# Patient Record
Sex: MALE | Race: WHITE | NOT HISPANIC OR LATINO | URBAN - NONMETROPOLITAN AREA
[De-identification: names, ages, dates, MRNs, and addresses within clinical notes are randomized per-mention and may not be internally consistent; named-entity substitution may affect disease eponyms.]

---

## 2018-07-06 ENCOUNTER — OFFICE VISIT (OUTPATIENT)
Dept: RETAIL CLINIC | Facility: CLINIC | Age: 48
End: 2018-07-06

## 2018-07-06 VITALS
WEIGHT: 233.8 LBS | SYSTOLIC BLOOD PRESSURE: 138 MMHG | TEMPERATURE: 98.1 F | HEART RATE: 83 BPM | RESPIRATION RATE: 18 BRPM | DIASTOLIC BLOOD PRESSURE: 88 MMHG | BODY MASS INDEX: 33.47 KG/M2 | HEIGHT: 70 IN | OXYGEN SATURATION: 98 %

## 2018-07-06 DIAGNOSIS — T63.461A WASP STING, ACCIDENTAL OR UNINTENTIONAL, INITIAL ENCOUNTER: Primary | ICD-10-CM

## 2018-07-06 PROCEDURE — 99202 OFFICE O/P NEW SF 15 MIN: CPT | Performed by: NURSE PRACTITIONER

## 2018-07-06 PROCEDURE — 96372 THER/PROPH/DIAG INJ SC/IM: CPT | Performed by: NURSE PRACTITIONER

## 2018-07-06 RX ORDER — METHYLPREDNISOLONE 4 MG/1
TABLET ORAL
Qty: 1 EACH | Refills: 0 | Status: SHIPPED | OUTPATIENT
Start: 2018-07-06

## 2018-07-06 RX ORDER — DIPHENHYDRAMINE HCL 25 MG
25 TABLET ORAL EVERY 6 HOURS
Qty: 8 TABLET | Refills: 0
Start: 2018-07-06 | End: 2018-07-08

## 2018-07-06 RX ORDER — DEXAMETHASONE SODIUM PHOSPHATE 4 MG/ML
8 INJECTION, SOLUTION INTRA-ARTICULAR; INTRALESIONAL; INTRAMUSCULAR; INTRAVENOUS; SOFT TISSUE ONCE
Status: COMPLETED | OUTPATIENT
Start: 2018-07-06 | End: 2018-07-06

## 2018-07-06 RX ADMIN — DEXAMETHASONE SODIUM PHOSPHATE 8 MG: 4 INJECTION, SOLUTION INTRA-ARTICULAR; INTRALESIONAL; INTRAMUSCULAR; INTRAVENOUS; SOFT TISSUE at 10:32

## 2018-07-06 NOTE — PROGRESS NOTES
"  Chief Complaint   Patient presents with   • wasp sting     Subjective   Jonas Orourke is a 47 y.o. male who presents to the clinic today with complaints of wasp sting.  He was stung twice yesterday (about 20 hours ago) once on his right index finger and once near right elbow.  He put his hand in ice water for 20 minutes and also put an ice pack on his arm.  He took Benadryl twice yesterday.  When he went to bed last night the areas looked a little better. This morning he has had more swelling, warmth, redness, itchiness. He does note sleeping on his right arm last night.   He has been stung in the past and had a severe reaction with swelling (no anaphylaxis) at that time.  He has \"weird sleep and dreams\" when taking Benadryl, but otherwise can tolerate it okay.     His blood pressure is high today.  He has been out of Benicar since Monday.  He will obtain a refill here at [facility] today and start taking it.     HPI    Current Outpatient Prescriptions:   •  LEVOTHYROXINE SODIUM PO, Take  by mouth., Disp: , Rfl:   •  Olmesartan Medoxomil (BENICAR PO), Take  by mouth., Disp: , Rfl:     Allergies:  Penicillins    Past Medical History:   Diagnosis Date   • Acid reflux    • Allergic    • Hypertension    • Kidney stone      Past Surgical History:   Procedure Laterality Date   • ORBITAL DECOMPRESSION Bilateral      Family History   Problem Relation Age of Onset   • Cancer Father      Social History   Substance Use Topics   • Smoking status: Never Smoker   • Smokeless tobacco: Never Used   • Alcohol use No       Review of Systems  Review of Systems   Constitutional: Negative for chills, fatigue and fever.   HENT: Negative for facial swelling and trouble swallowing.    Eyes: Negative.    Respiratory: Negative for chest tightness, shortness of breath and wheezing.    Skin: Positive for wound.        See HPI       Objective   /88 (BP Location: Left arm, Patient Position: Sitting, Cuff Size: Adult)   Pulse 83   Temp " "98.1 °F (36.7 °C) (Oral)   Resp 18   Ht 177.8 cm (70\")   Wt 106 kg (233 lb 12.8 oz)   SpO2 98%   BMI 33.55 kg/m²       Physical Exam   Constitutional: He is oriented to person, place, and time. He appears well-developed and well-nourished. No distress.   HENT:   Head: Normocephalic and atraumatic.   Mouth/Throat: Oropharynx is clear and moist.   Cardiovascular: Normal rate, regular rhythm and normal heart sounds.    Pulmonary/Chest: Effort normal and breath sounds normal. No respiratory distress. He has no wheezes.   Neurological: He is alert and oriented to person, place, and time.   Skin: Skin is warm and dry.        Psychiatric: He has a normal mood and affect. His behavior is normal.       Assessment/Plan     Jonas was seen today for wasp sting.    Diagnoses and all orders for this visit:    Wasp sting, accidental or unintentional, initial encounter  -     dexamethasone (DECADRON) injection 8 mg; Inject 2 mL into the shoulder, thigh, or buttocks 1 (One) Time.    Other orders  -     MethylPREDNISolone (MEDROL, MANUELA,) 4 MG tablet; Take as directed on package instructions.  -     diphenhydrAMINE (BENADRYL) 25 MG tablet; Take 1 tablet by mouth Every 6 (Six) Hours for 2 days.      Elevate arm when possible. Take Benadryl every six hours for the next couple of days if tolerated.  Apply ice pack to your hand and elbow for 10 minutes at a time a few times today.  Try not to put pressure on your arm (don't sleep on this side).   Monitor for s/s of secondary infection as discussed.     Risk and benefits of steroids discussed. Start steroid pack tomorrow.  Take only as prescribed.  Take with food.   ~Steroids can increase blood pressure and blood sugar.  ~Steroids can cause increased irritability, increased energy & appetite, and may disrupt sleep.   ~They may cause sun sensitivity. You should avoid prolonged sun exposure and use sunscreen if outdoors.   ~Do not take additional NSAIDS such as ibuprofen, Motrin, Advil, " Aleve, Naproxen while taking this medicine.  It is okay to take Tylenol while taking steroids.    Restart Benicar today and monitor blood pressure as discussed.     If symptoms persist or worsen please see your primary care provider or go to urgent care.

## 2018-07-06 NOTE — PATIENT INSTRUCTIONS
Bee, Wasp, or Hornet Sting, Adult  Bees, wasps, and hornets are part of a family of insects that can sting people. These stings can cause pain and inflammation, but they are usually not serious. However, some people may have an allergic reaction to a sting. This can cause the symptoms to be more severe.  What increases the risk?  You may be at a greater risk of getting stung if you:  · Provoke a stinging insect by swatting or disturbing it.  · Wear strong-smelling soaps, deodorants, or body sprays.  · Spend time outdoors near gardens with flowers or fruit trees or in clothes that expose skin.  · Eat or drink outside.    What are the signs or symptoms?  Common symptoms of this condition include:  · A red lump in the skin that sometimes has a tiny hole in the center. In some cases, a stinger may be in the center of the wound.  · Pain and itching at the sting site.  · Redness and swelling around the sting site. If you have an allergic reaction (localized allergic reaction), the swelling and redness may spread out from the sting site. In some cases, this reaction can continue to develop over the next 24-48 hours.    In rare cases, a person may have a severe allergic reaction (anaphylactic reaction) to a sting. Symptoms of an anaphylactic reaction may include:  · Wheezing or difficulty breathing.  · Raised, itchy, red patches on the skin (hives).  · Nausea or vomiting.  · Abdominal cramping.  · Diarrhea.  · Tightness in the chest or chest pain.  · Dizziness or fainting.  · Redness of the face (flushing).  · Hoarse voice.  · Swollen tongue, lips, or face.    How is this diagnosed?  This condition is usually diagnosed based on your symptoms and medical history as well as a physical exam. You may have an allergy test to determine if you are allergic to the substance that the insect injected during the sting (venom).  How is this treated?  If you were stung by a bee, the stinger and a small sac of venom may be in the wound.  It is important to remove the stinger as soon as possible. You can do this by brushing across the wound with gauze, a fingernail, or a flat card such as a credit card. Removing the stinger can help reduce the severity of your body’s reaction to the sting.  Most stings can be treated with:  · Icing to reduce swelling in the area.  · Medicines (antihistamines) to treat itching or an allergic reaction.  · Medicines to help reduce pain. These may be medicines that you take by mouth, or medicated creams or lotions that you apply to your skin.    Pay close attention to your symptoms after you have been stung. If possible, have someone stay with you to make sure you do not have an allergic reaction. If you have any signs of an allergic reaction, call your health care provider. If you have ever had a severe allergic reaction, your health care provider may give you an inhaler or injectable medicine (epinephrine auto-injector) to use if necessary.  Follow these instructions at home:  · Wash the sting site 2-3 times each day with soap and water as told by your health care provider.  · Apply or take over-the-counter and prescription medicines only as told by your health care provider.  · If directed, apply ice to the sting area.  ? Put ice in a plastic bag.  ? Place a towel between your skin and the bag.  ? Leave the ice on for 20 minutes, 2-3 times a day.  · Do not scratch the sting area.  · If you had a severe allergic reaction to a sting, you may need:  ? To wear a medical bracelet or necklace that lists the allergy.  ? To learn when and how to use an anaphylaxis kit or epinephrine injection. Your family members and coworkers may also need to learn this.  ? To carry an anaphylaxis kit or epinephrine injection with you at all times.  How is this prevented?  · Avoid swatting at stinging insects and disturbing insect nests.  · Do not use fragrant soaps or lotions.  · Wear shoes, pants, and long sleeves when spending time  outdoors, especially in grassy areas where stinging insects are common.  · Keep outdoor areas free from nests or hives.  · Keep food and drink containers covered when eating outdoors.  · Avoid working or sitting near flowering plants, if possible.  · Wear gloves if you are gardening or working outdoors.  · If an attack by a stinging insect or a swarm seems likely in the moment, move away from the area or find a barrier between you and the insect(s), such as a door.  Contact a health care provider if:  · Your symptoms do not get better in 2-3 days.  · You have redness, swelling, or pain that spreads beyond the area of the sting.  · You have a fever.  Get help right away if:  You have symptoms of a severe allergic reaction. These include:  · Wheezing or difficulty breathing.  · Tightness in the chest or chest pain.  · Light-headedness or fainting.  · Itchy, raised, red patches on the skin.  · Nausea or vomiting.  · Abdominal cramping.  · Diarrhea.  · A swollen tongue or lips, or trouble swallowing.  · Dizziness or fainting.    Summary  · Stings from bees, wasps, and hornets can cause pain and inflammation, but they are usually not serious. However, some people may have an allergic reaction to a sting. This can cause the symptoms to be more severe.  · Pay close attention to your symptoms after you have been stung. If possible, have someone stay with you to make sure you do not have an allergic reaction.  · Call your health care provider if you have any signs of an allergic reaction.  This information is not intended to replace advice given to you by your health care provider. Make sure you discuss any questions you have with your health care provider.  Document Released: 12/18/2006 Document Revised: 02/22/2018 Document Reviewed: 02/22/2018  [employer] Interactive Patient Education © 2018 [employer]    Elevate arm when possible. Take Benadryl every six hours for the next couple of days if tolerated.  Apply ice pack to  your hand and elbow for 10-20 minutes at a time a few times today.  Try not to put pressure on your arm (don't sleep on this side).   Monitor for s/s of secondary infection as discussed.     Risk and benefits of steroids discussed. Start steroid pack tomorrow.  Take only as prescribed.  Take with food.   ~Steroids can increase blood pressure and blood sugar.  ~Steroids can cause increased irritability, increased energy & appetite, and may disrupt sleep.   ~They may cause sun sensitivity. You should avoid prolonged sun exposure and use sunscreen if outdoors.   ~Do not take additional NSAIDS such as ibuprofen, Motrin, Advil, Aleve, Naproxen while taking this medicine.  It is okay to take Tylenol while taking steroids.    Restart Benicar today and monitor blood pressure as discussed.     If symptoms persist or worsen please see your primary care provider or go to urgent care.

## 2019-07-18 ENCOUNTER — OFFICE VISIT (OUTPATIENT)
Dept: URGENT CARE | Age: 49
End: 2019-07-18
Payer: COMMERCIAL

## 2019-07-18 ENCOUNTER — HOSPITAL ENCOUNTER (OUTPATIENT)
Dept: GENERAL RADIOLOGY | Age: 49
Discharge: HOME | End: 2019-07-18
Payer: COMMERCIAL

## 2019-07-18 VITALS
HEIGHT: 70 IN | WEIGHT: 227 LBS | RESPIRATION RATE: 16 BRPM | DIASTOLIC BLOOD PRESSURE: 78 MMHG | OXYGEN SATURATION: 98 % | TEMPERATURE: 98.4 F | BODY MASS INDEX: 32.5 KG/M2 | HEART RATE: 115 BPM | SYSTOLIC BLOOD PRESSURE: 140 MMHG

## 2019-07-18 DIAGNOSIS — M53.3 COCCYX PAIN: Primary | ICD-10-CM

## 2019-07-18 DIAGNOSIS — M53.3 COCCYX PAIN: ICD-10-CM

## 2019-07-18 PROCEDURE — 96372 THER/PROPH/DIAG INJ SC/IM: CPT | Performed by: SPECIALIST

## 2019-07-18 PROCEDURE — 99202 OFFICE O/P NEW SF 15 MIN: CPT | Performed by: SPECIALIST

## 2019-07-18 PROCEDURE — 72100 X-RAY EXAM L-S SPINE 2/3 VWS: CPT

## 2019-07-18 RX ORDER — TRIAMCINOLONE ACETONIDE 40 MG/ML
40 INJECTION, SUSPENSION INTRA-ARTICULAR; INTRAMUSCULAR ONCE
Status: COMPLETED | OUTPATIENT
Start: 2019-07-18 | End: 2019-07-18

## 2019-07-18 RX ORDER — LEVOCETIRIZINE DIHYDROCHLORIDE 5 MG/1
TABLET, FILM COATED ORAL
Refills: 1 | COMMUNITY
Start: 2019-06-03 | End: 2019-12-30

## 2019-07-18 RX ORDER — PHENTERMINE HYDROCHLORIDE 37.5 MG/1
TABLET ORAL
Refills: 0 | COMMUNITY
Start: 2019-06-03 | End: 2019-12-30

## 2019-07-18 RX ORDER — MONTELUKAST SODIUM 10 MG/1
TABLET ORAL
Refills: 0 | COMMUNITY
Start: 2019-06-03 | End: 2019-12-30

## 2019-07-18 RX ORDER — METHYLPREDNISOLONE 4 MG/1
TABLET ORAL
Qty: 1 KIT | Refills: 0 | Status: SHIPPED | OUTPATIENT
Start: 2019-07-18 | End: 2019-11-11 | Stop reason: ALTCHOICE

## 2019-07-18 RX ADMIN — TRIAMCINOLONE ACETONIDE 40 MG: 40 INJECTION, SUSPENSION INTRA-ARTICULAR; INTRAMUSCULAR at 14:51

## 2019-07-18 ASSESSMENT — ENCOUNTER SYMPTOMS
BOWEL INCONTINENCE: 0
BACK PAIN: 1

## 2019-07-18 NOTE — PATIENT INSTRUCTIONS
Patient Education        Coccyx Pain: Care Instructions  Your Care Instructions    The coccyx is your tailbone. You can have pain in your tailbone from a fall or other injury. Pregnancy and childbirth also can cause tailbone pain. Sometimes, the cause of pain is not known. A tailbone injury causes pain when you sit, especially when you slump or sit on a hard seat. Straining to have a bowel movement also can be very painful. Tailbone injuries can take several months to heal, but in some cases the pain goes even longer. You can take steps at home to ease the pain. In some cases, a doctor injects a corticosteroid medicine into the coccyx to reduce swelling and pain. Follow-up care is a key part of your treatment and safety. Be sure to make and go to all appointments, and call your doctor if you are having problems. It's also a good idea to know your test results and keep a list of the medicines you take. How can you care for yourself at home? · Take pain medicines exactly as directed. ? If the doctor gave you a prescription medicine for pain, take it as prescribed. ? If you are not taking a prescription pain medicine, take an over-the-counter medicine to reduce pain. · Put ice or a cold pack on your tailbone for 10 to 20 minutes at a time. Try to do this every 1 to 2 hours for the next 3 days (when you are awake) or until the swelling goes down. Put a thin cloth between the ice and your skin. · About 2 or 3 days after your injury, you can alternate ice and heat. To soothe the tailbone area, take a warm bath for 20 minutes, 3 or 4 times a day. · Sit on soft, padded surfaces. A doughnut-shaped pillow can take pressure off the tailbone. · Avoid constipation, because straining to have a bowel movement will increase your tailbone pain. ? Include fruits, vegetables, beans, and whole grains in your diet each day. These foods are high in fiber.   ? Drink plenty of fluids, enough so that your urine is light yellow

## 2019-07-18 NOTE — PROGRESS NOTES
[facility]  [address]  [address] [place] [address]  Dept: [phone]  Loc: [phone]    Arlys Lesches is a 44 y.o. male who presents today for his medical conditions/complaintsas noted below. Arlys Lesches is c/o of Back Pain        HPI:     Patient denies any known injury. He denies any s/s of cauda equina syndrome. Back Pain   This is a new problem. The current episode started yesterday. The problem has been rapidly worsening since onset. The pain is present in the lumbar spine. The quality of the pain is described as stabbing. The pain does not radiate. The pain is moderate. The pain is the same all the time. The symptoms are aggravated by sitting. Pertinent negatives include no bladder incontinence, bowel incontinence, numbness, paresis, perianal numbness or tingling. He has tried nothing for the symptoms. Past Medical History:   Diagnosis Date    Elevated LFTs     HTN (hypertension)     Hypothyroid      Past Surgical History:   Procedure Laterality Date    EYE SURGERY      ORBITAL RIM RECONSTRUCTION         Family History   Problem Relation Age of Onset    Cancer Father         prostate    Heart Disease Maternal Grandmother     High Blood Pressure Maternal Grandmother     Diabetes Paternal Grandmother     Cancer Paternal Grandmother         breast       Social History     Tobacco Use    Smoking status: Never Smoker    Smokeless tobacco: Never Used   Substance Use Topics    Alcohol use: No      Current Outpatient Medications   Medication Sig Dispense Refill    montelukast (SINGULAIR) 10 MG tablet TAKE 1 TABLET BY MOUTH ONCE DAILY AS NEEDED FOR 30 DAYS  0    phentermine (ADIPEX-P) 37.5 MG tablet TAKE 1 TABLET BY MOUTH ONCE DAILY AS DIRECTED FOR 30 DAYS  0    methylPREDNISolone (MEDROL DOSEPACK) 4 MG tablet Take by mouth.  1 kit 0    levothyroxine (SYNTHROID) 150 MCG tablet       losartan-hydrochlorothiazide (HYZAAR) 100-12.5 MG per

## 2019-11-11 ENCOUNTER — OFFICE VISIT (OUTPATIENT)
Dept: SURGERY | Age: 49
End: 2019-11-11
Payer: COMMERCIAL

## 2019-11-11 VITALS
HEIGHT: 70 IN | BODY MASS INDEX: 30.75 KG/M2 | DIASTOLIC BLOOD PRESSURE: 74 MMHG | TEMPERATURE: 98 F | WEIGHT: 214.8 LBS | SYSTOLIC BLOOD PRESSURE: 120 MMHG

## 2019-11-11 DIAGNOSIS — K42.9 UMBILICAL HERNIA WITHOUT OBSTRUCTION AND WITHOUT GANGRENE: Primary | ICD-10-CM

## 2019-11-11 PROCEDURE — 99213 OFFICE O/P EST LOW 20 MIN: CPT | Performed by: SURGERY

## 2019-11-11 ASSESSMENT — ENCOUNTER SYMPTOMS
CONSTIPATION: 0
EYE REDNESS: 0
COUGH: 0
ABDOMINAL DISTENTION: 0
RHINORRHEA: 1
APNEA: 1
ABDOMINAL PAIN: 0
DIARRHEA: 0
SHORTNESS OF BREATH: 0
BACK PAIN: 1
EYE PAIN: 0

## 2019-11-25 ENCOUNTER — TELEPHONE (OUTPATIENT)
Dept: GASTROENTEROLOGY | Age: 49
End: 2019-11-25

## 2019-12-18 NOTE — TELEPHONE ENCOUNTER
Called pt and scheduled an appt for Updated H&P for surgery
Called pt to confirm 01/08/2020 surgery
Phi requests that Alondra Cai return their call. The best time to reach him is Anytime. Pt would like to talk about getting on the surgery list for 1-8-2020. Please contact pt back today and advise. Thank you.
Phi requests that office return their call. The best time to reach him is Anytime. Patient is wanting to discuss setting up a surgery possibly on 1/06/20. Thank you.
Will forward to Sloan Jason
none

## 2019-12-30 ENCOUNTER — OFFICE VISIT (OUTPATIENT)
Dept: SURGERY | Age: 49
End: 2019-12-30
Payer: COMMERCIAL

## 2019-12-30 ENCOUNTER — HOSPITAL ENCOUNTER (OUTPATIENT)
Dept: PREADMISSION TESTING | Age: 49
Discharge: HOME | End: 2020-01-03
Payer: COMMERCIAL

## 2019-12-30 VITALS — BODY MASS INDEX: 32.93 KG/M2 | HEIGHT: 70 IN | WEIGHT: 230 LBS

## 2019-12-30 VITALS
BODY MASS INDEX: 32.93 KG/M2 | DIASTOLIC BLOOD PRESSURE: 70 MMHG | WEIGHT: 230 LBS | HEIGHT: 70 IN | SYSTOLIC BLOOD PRESSURE: 118 MMHG | HEART RATE: 80 BPM

## 2019-12-30 DIAGNOSIS — K42.9 UMBILICAL HERNIA WITHOUT OBSTRUCTION AND WITHOUT GANGRENE: Primary | ICD-10-CM

## 2019-12-30 LAB
ANION GAP SERPL CALCULATED.3IONS-SCNC: 16 MMOL/L (ref 7–19)
BASOPHILS ABSOLUTE: 0.1 K/UL (ref 0–0.2)
BASOPHILS RELATIVE PERCENT: 0.8 % (ref 0–1)
BUN BLDV-MCNC: 18 MG/DL (ref 6–20)
CALCIUM SERPL-MCNC: 9.4 MG/DL (ref 8.6–10)
CHLORIDE BLD-SCNC: 101 MMOL/L (ref 98–111)
CO2: 22 MMOL/L (ref 22–29)
CREAT SERPL-MCNC: 1.1 MG/DL (ref 0.5–1.2)
EKG P AXIS: 48 DEGREES
EKG P-R INTERVAL: 158 MS
EKG Q-T INTERVAL: 308 MS
EKG QRS DURATION: 74 MS
EKG QTC CALCULATION (BAZETT): 399 MS
EKG T AXIS: 6 DEGREES
EOSINOPHILS ABSOLUTE: 0.1 K/UL (ref 0–0.6)
EOSINOPHILS RELATIVE PERCENT: 1.5 % (ref 0–5)
GFR NON-AFRICAN AMERICAN: >60
GLUCOSE BLD-MCNC: 86 MG/DL (ref 74–109)
HCT VFR BLD CALC: 48.1 % (ref 42–52)
HEMOGLOBIN: 15.1 G/DL (ref 14–18)
IMMATURE GRANULOCYTES #: 0 K/UL
LYMPHOCYTES ABSOLUTE: 0.7 K/UL (ref 1.1–4.5)
LYMPHOCYTES RELATIVE PERCENT: 11.9 % (ref 20–40)
MCH RBC QN AUTO: 29.3 PG (ref 27–31)
MCHC RBC AUTO-ENTMCNC: 31.4 G/DL (ref 33–37)
MCV RBC AUTO: 93.4 FL (ref 80–94)
MONOCYTES ABSOLUTE: 0.5 K/UL (ref 0–0.9)
MONOCYTES RELATIVE PERCENT: 8.6 % (ref 0–10)
NEUTROPHILS ABSOLUTE: 4.7 K/UL (ref 1.5–7.5)
NEUTROPHILS RELATIVE PERCENT: 77 % (ref 50–65)
PDW BLD-RTO: 14.3 % (ref 11.5–14.5)
PLATELET # BLD: 237 K/UL (ref 130–400)
PMV BLD AUTO: 10.2 FL (ref 9.4–12.4)
POTASSIUM SERPL-SCNC: 3.8 MMOL/L (ref 3.5–5)
RBC # BLD: 5.15 M/UL (ref 4.7–6.1)
SODIUM BLD-SCNC: 139 MMOL/L (ref 136–145)
WBC # BLD: 6.1 K/UL (ref 4.8–10.8)

## 2019-12-30 PROCEDURE — 93010 ELECTROCARDIOGRAM REPORT: CPT | Performed by: INTERNAL MEDICINE

## 2019-12-30 PROCEDURE — 87081 CULTURE SCREEN ONLY: CPT

## 2019-12-30 PROCEDURE — 85025 COMPLETE CBC W/AUTO DIFF WBC: CPT

## 2019-12-30 PROCEDURE — 80048 BASIC METABOLIC PNL TOTAL CA: CPT

## 2019-12-30 PROCEDURE — 99212 OFFICE O/P EST SF 10 MIN: CPT | Performed by: SURGERY

## 2019-12-30 PROCEDURE — 93005 ELECTROCARDIOGRAM TRACING: CPT | Performed by: SURGERY

## 2019-12-30 RX ORDER — TELMISARTAN AND HYDROCHLORTHIAZIDE 80; 25 MG/1; MG/1
1 TABLET ORAL DAILY
COMMUNITY

## 2019-12-30 RX ORDER — PHENTERMINE HYDROCHLORIDE 37.5 MG/1
37.5 TABLET ORAL
COMMUNITY

## 2020-01-01 LAB — MRSA CULTURE ONLY: NORMAL

## 2020-01-08 ENCOUNTER — HOSPITAL ENCOUNTER (OUTPATIENT)
Age: 50
Setting detail: OUTPATIENT SURGERY
Discharge: HOME | End: 2020-01-08
Attending: SURGERY | Admitting: SURGERY
Payer: COMMERCIAL

## 2020-01-08 ENCOUNTER — PREP FOR PROCEDURE (OUTPATIENT)
Dept: SURGERY | Age: 50
End: 2020-01-08

## 2020-01-08 ENCOUNTER — ANESTHESIA (OUTPATIENT)
Dept: OPERATING ROOM | Age: 50
End: 2020-01-08
Payer: COMMERCIAL

## 2020-01-08 ENCOUNTER — ANESTHESIA EVENT (OUTPATIENT)
Dept: OPERATING ROOM | Age: 50
End: 2020-01-08
Payer: COMMERCIAL

## 2020-01-08 VITALS
SYSTOLIC BLOOD PRESSURE: 149 MMHG | DIASTOLIC BLOOD PRESSURE: 97 MMHG | BODY MASS INDEX: 32.93 KG/M2 | TEMPERATURE: 97.3 F | HEART RATE: 72 BPM | WEIGHT: 230 LBS | HEIGHT: 70 IN | OXYGEN SATURATION: 100 % | RESPIRATION RATE: 18 BRPM

## 2020-01-08 VITALS
TEMPERATURE: 96.3 F | OXYGEN SATURATION: 98 % | RESPIRATION RATE: 1 BRPM | DIASTOLIC BLOOD PRESSURE: 65 MMHG | SYSTOLIC BLOOD PRESSURE: 117 MMHG

## 2020-01-08 PROCEDURE — 6360000002 HC RX W HCPCS: Performed by: NURSE ANESTHETIST, CERTIFIED REGISTERED

## 2020-01-08 PROCEDURE — 6360000002 HC RX W HCPCS: Performed by: ANESTHESIOLOGY

## 2020-01-08 PROCEDURE — 2500000003 HC RX 250 WO HCPCS: Performed by: NURSE ANESTHETIST, CERTIFIED REGISTERED

## 2020-01-08 PROCEDURE — 7100000001 HC PACU RECOVERY - ADDTL 15 MIN: Performed by: SURGERY

## 2020-01-08 PROCEDURE — 3700000000 HC ANESTHESIA ATTENDED CARE: Performed by: SURGERY

## 2020-01-08 PROCEDURE — 3600000014 HC SURGERY LEVEL 4 ADDTL 15MIN: Performed by: SURGERY

## 2020-01-08 PROCEDURE — 7100000000 HC PACU RECOVERY - FIRST 15 MIN: Performed by: SURGERY

## 2020-01-08 PROCEDURE — 3600000004 HC SURGERY LEVEL 4 BASE: Performed by: SURGERY

## 2020-01-08 PROCEDURE — 2500000003 HC RX 250 WO HCPCS: Performed by: SURGERY

## 2020-01-08 PROCEDURE — 2580000003 HC RX 258: Performed by: NURSE ANESTHETIST, CERTIFIED REGISTERED

## 2020-01-08 PROCEDURE — 6370000000 HC RX 637 (ALT 250 FOR IP): Performed by: ANESTHESIOLOGY

## 2020-01-08 PROCEDURE — 7100000010 HC PHASE II RECOVERY - FIRST 15 MIN: Performed by: SURGERY

## 2020-01-08 PROCEDURE — 2709999900 HC NON-CHARGEABLE SUPPLY: Performed by: SURGERY

## 2020-01-08 PROCEDURE — C1781 MESH (IMPLANTABLE): HCPCS | Performed by: SURGERY

## 2020-01-08 PROCEDURE — 3700000001 HC ADD 15 MINUTES (ANESTHESIA): Performed by: SURGERY

## 2020-01-08 PROCEDURE — 49585 REPAIR UMBILICAL HERN,5+Y/O,REDUC: CPT | Performed by: SURGERY

## 2020-01-08 PROCEDURE — 2580000003 HC RX 258: Performed by: ANESTHESIOLOGY

## 2020-01-08 DEVICE — MESH SURG W6.4XL6.4CM CIR VENTRAL PTCH FOR TISS SEPARATING: Type: IMPLANTABLE DEVICE | Site: GROIN | Status: FUNCTIONAL

## 2020-01-08 RX ORDER — SODIUM CHLORIDE, SODIUM LACTATE, POTASSIUM CHLORIDE, CALCIUM CHLORIDE 600; 310; 30; 20 MG/100ML; MG/100ML; MG/100ML; MG/100ML
INJECTION, SOLUTION INTRAVENOUS CONTINUOUS
Status: DISCONTINUED | OUTPATIENT
Start: 2020-01-08 | End: 2020-01-08 | Stop reason: HOSPADM

## 2020-01-08 RX ORDER — LIDOCAINE HYDROCHLORIDE 10 MG/ML
1 INJECTION, SOLUTION EPIDURAL; INFILTRATION; INTRACAUDAL; PERINEURAL
Status: DISCONTINUED | OUTPATIENT
Start: 2020-01-08 | End: 2020-01-08 | Stop reason: HOSPADM

## 2020-01-08 RX ORDER — MIDAZOLAM HYDROCHLORIDE 1 MG/ML
2 INJECTION INTRAMUSCULAR; INTRAVENOUS
Status: DISCONTINUED | OUTPATIENT
Start: 2020-01-08 | End: 2020-01-08 | Stop reason: HOSPADM

## 2020-01-08 RX ORDER — PROPOFOL 10 MG/ML
INJECTION, EMULSION INTRAVENOUS CONTINUOUS PRN
Status: DISCONTINUED | OUTPATIENT
Start: 2020-01-08 | End: 2020-01-08 | Stop reason: SDUPTHER

## 2020-01-08 RX ORDER — METOCLOPRAMIDE HYDROCHLORIDE 5 MG/ML
10 INJECTION INTRAMUSCULAR; INTRAVENOUS
Status: DISCONTINUED | OUTPATIENT
Start: 2020-01-08 | End: 2020-01-08 | Stop reason: HOSPADM

## 2020-01-08 RX ORDER — FENTANYL CITRATE 50 UG/ML
50 INJECTION, SOLUTION INTRAMUSCULAR; INTRAVENOUS
Status: DISCONTINUED | OUTPATIENT
Start: 2020-01-08 | End: 2020-01-08 | Stop reason: HOSPADM

## 2020-01-08 RX ORDER — MORPHINE SULFATE 4 MG/ML
4 INJECTION, SOLUTION INTRAMUSCULAR; INTRAVENOUS EVERY 5 MIN PRN
Status: DISCONTINUED | OUTPATIENT
Start: 2020-01-08 | End: 2020-01-08 | Stop reason: HOSPADM

## 2020-01-08 RX ORDER — HYDROCODONE BITARTRATE AND ACETAMINOPHEN 5; 325 MG/1; MG/1
2 TABLET ORAL EVERY 6 HOURS PRN
Qty: 30 TABLET | Refills: 0 | Status: SHIPPED | OUTPATIENT
Start: 2020-01-08 | End: 2020-01-08

## 2020-01-08 RX ORDER — ONDANSETRON 2 MG/ML
INJECTION INTRAMUSCULAR; INTRAVENOUS PRN
Status: DISCONTINUED | OUTPATIENT
Start: 2020-01-08 | End: 2020-01-08

## 2020-01-08 RX ORDER — MEPERIDINE HYDROCHLORIDE 50 MG/ML
12.5 INJECTION INTRAMUSCULAR; INTRAVENOUS; SUBCUTANEOUS EVERY 5 MIN PRN
Status: DISCONTINUED | OUTPATIENT
Start: 2020-01-08 | End: 2020-01-08 | Stop reason: HOSPADM

## 2020-01-08 RX ORDER — ONDANSETRON 2 MG/ML
4 INJECTION INTRAMUSCULAR; INTRAVENOUS EVERY 6 HOURS PRN
Status: CANCELLED | OUTPATIENT
Start: 2020-01-08

## 2020-01-08 RX ORDER — ENALAPRILAT 2.5 MG/2ML
1.25 INJECTION INTRAVENOUS
Status: DISCONTINUED | OUTPATIENT
Start: 2020-01-08 | End: 2020-01-08 | Stop reason: HOSPADM

## 2020-01-08 RX ORDER — HYDROCODONE BITARTRATE AND ACETAMINOPHEN 5; 325 MG/1; MG/1
2 TABLET ORAL EVERY 4 HOURS PRN
Status: DISCONTINUED | OUTPATIENT
Start: 2020-01-08 | End: 2020-01-08 | Stop reason: HOSPADM

## 2020-01-08 RX ORDER — HYDROCODONE BITARTRATE AND ACETAMINOPHEN 5; 325 MG/1; MG/1
1 TABLET ORAL EVERY 4 HOURS PRN
Status: DISCONTINUED | OUTPATIENT
Start: 2020-01-08 | End: 2020-01-08 | Stop reason: HOSPADM

## 2020-01-08 RX ORDER — SODIUM CHLORIDE 9 MG/ML
INJECTION, SOLUTION INTRAVENOUS CONTINUOUS
Status: DISCONTINUED | OUTPATIENT
Start: 2020-01-08 | End: 2020-01-08 | Stop reason: HOSPADM

## 2020-01-08 RX ORDER — SODIUM CHLORIDE 0.9 % (FLUSH) 0.9 %
10 SYRINGE (ML) INJECTION EVERY 12 HOURS SCHEDULED
Status: CANCELLED | OUTPATIENT
Start: 2020-01-08

## 2020-01-08 RX ORDER — SODIUM CHLORIDE 0.9 % (FLUSH) 0.9 %
10 SYRINGE (ML) INJECTION PRN
Status: DISCONTINUED | OUTPATIENT
Start: 2020-01-08 | End: 2020-01-08 | Stop reason: HOSPADM

## 2020-01-08 RX ORDER — FENTANYL CITRATE 50 UG/ML
25 INJECTION, SOLUTION INTRAMUSCULAR; INTRAVENOUS
Status: DISCONTINUED | OUTPATIENT
Start: 2020-01-08 | End: 2020-01-08 | Stop reason: HOSPADM

## 2020-01-08 RX ORDER — BUPIVACAINE HYDROCHLORIDE AND EPINEPHRINE 2.5; 5 MG/ML; UG/ML
INJECTION, SOLUTION INFILTRATION; PERINEURAL PRN
Status: DISCONTINUED | OUTPATIENT
Start: 2020-01-08 | End: 2020-01-08 | Stop reason: ALTCHOICE

## 2020-01-08 RX ORDER — VANCOMYCIN HYDROCHLORIDE 1 G/20ML
INJECTION, POWDER, LYOPHILIZED, FOR SOLUTION INTRAVENOUS PRN
Status: DISCONTINUED | OUTPATIENT
Start: 2020-01-08 | End: 2020-01-08 | Stop reason: SDUPTHER

## 2020-01-08 RX ORDER — SODIUM CHLORIDE, SODIUM LACTATE, POTASSIUM CHLORIDE, CALCIUM CHLORIDE 600; 310; 30; 20 MG/100ML; MG/100ML; MG/100ML; MG/100ML
INJECTION, SOLUTION INTRAVENOUS CONTINUOUS PRN
Status: DISCONTINUED | OUTPATIENT
Start: 2020-01-08 | End: 2020-01-08 | Stop reason: SDUPTHER

## 2020-01-08 RX ORDER — HYDROCODONE BITARTRATE AND ACETAMINOPHEN 5; 325 MG/1; MG/1
2 TABLET ORAL EVERY 6 HOURS PRN
Qty: 30 TABLET | Refills: 0 | Status: SHIPPED | OUTPATIENT
Start: 2020-01-08 | End: 2020-01-13

## 2020-01-08 RX ORDER — LIDOCAINE HYDROCHLORIDE 20 MG/ML
INJECTION, SOLUTION INFILTRATION; PERINEURAL PRN
Status: DISCONTINUED | OUTPATIENT
Start: 2020-01-08 | End: 2020-01-08 | Stop reason: SDUPTHER

## 2020-01-08 RX ORDER — DEXAMETHASONE SODIUM PHOSPHATE 4 MG/ML
INJECTION, SOLUTION INTRA-ARTICULAR; INTRALESIONAL; INTRAMUSCULAR; INTRAVENOUS; SOFT TISSUE PRN
Status: DISCONTINUED | OUTPATIENT
Start: 2020-01-08 | End: 2020-01-08 | Stop reason: SDUPTHER

## 2020-01-08 RX ORDER — SODIUM CHLORIDE 0.9 % (FLUSH) 0.9 %
10 SYRINGE (ML) INJECTION EVERY 12 HOURS SCHEDULED
Status: DISCONTINUED | OUTPATIENT
Start: 2020-01-08 | End: 2020-01-08 | Stop reason: HOSPADM

## 2020-01-08 RX ORDER — MORPHINE SULFATE 4 MG/ML
2 INJECTION, SOLUTION INTRAMUSCULAR; INTRAVENOUS
Status: CANCELLED | OUTPATIENT
Start: 2020-01-08

## 2020-01-08 RX ORDER — MIDAZOLAM HYDROCHLORIDE 1 MG/ML
INJECTION INTRAMUSCULAR; INTRAVENOUS PRN
Status: DISCONTINUED | OUTPATIENT
Start: 2020-01-08 | End: 2020-01-08 | Stop reason: SDUPTHER

## 2020-01-08 RX ORDER — SODIUM CHLORIDE 0.9 % (FLUSH) 0.9 %
10 SYRINGE (ML) INJECTION PRN
Status: CANCELLED | OUTPATIENT
Start: 2020-01-08

## 2020-01-08 RX ORDER — MORPHINE SULFATE 4 MG/ML
2 INJECTION, SOLUTION INTRAMUSCULAR; INTRAVENOUS EVERY 5 MIN PRN
Status: DISCONTINUED | OUTPATIENT
Start: 2020-01-08 | End: 2020-01-08 | Stop reason: HOSPADM

## 2020-01-08 RX ORDER — ROCURONIUM BROMIDE 10 MG/ML
INJECTION, SOLUTION INTRAVENOUS PRN
Status: DISCONTINUED | OUTPATIENT
Start: 2020-01-08 | End: 2020-01-08 | Stop reason: SDUPTHER

## 2020-01-08 RX ORDER — HYDRALAZINE HYDROCHLORIDE 20 MG/ML
5 INJECTION INTRAMUSCULAR; INTRAVENOUS EVERY 10 MIN PRN
Status: DISCONTINUED | OUTPATIENT
Start: 2020-01-08 | End: 2020-01-08 | Stop reason: HOSPADM

## 2020-01-08 RX ORDER — FENTANYL CITRATE 50 UG/ML
INJECTION, SOLUTION INTRAMUSCULAR; INTRAVENOUS PRN
Status: DISCONTINUED | OUTPATIENT
Start: 2020-01-08 | End: 2020-01-08 | Stop reason: SDUPTHER

## 2020-01-08 RX ORDER — APREPITANT 40 MG/1
40 CAPSULE ORAL ONCE
Status: COMPLETED | OUTPATIENT
Start: 2020-01-08 | End: 2020-01-08

## 2020-01-08 RX ORDER — MORPHINE SULFATE 4 MG/ML
4 INJECTION, SOLUTION INTRAMUSCULAR; INTRAVENOUS
Status: CANCELLED | OUTPATIENT
Start: 2020-01-08

## 2020-01-08 RX ORDER — SCOLOPAMINE TRANSDERMAL SYSTEM 1 MG/1
1 PATCH, EXTENDED RELEASE TRANSDERMAL
Status: DISCONTINUED | OUTPATIENT
Start: 2020-01-08 | End: 2020-01-08 | Stop reason: HOSPADM

## 2020-01-08 RX ORDER — PROPOFOL 10 MG/ML
INJECTION, EMULSION INTRAVENOUS PRN
Status: DISCONTINUED | OUTPATIENT
Start: 2020-01-08 | End: 2020-01-08 | Stop reason: SDUPTHER

## 2020-01-08 RX ORDER — PROMETHAZINE HYDROCHLORIDE 25 MG/ML
6.25 INJECTION, SOLUTION INTRAMUSCULAR; INTRAVENOUS
Status: DISCONTINUED | OUTPATIENT
Start: 2020-01-08 | End: 2020-01-08 | Stop reason: HOSPADM

## 2020-01-08 RX ORDER — LABETALOL 20 MG/4 ML (5 MG/ML) INTRAVENOUS SYRINGE
5 EVERY 10 MIN PRN
Status: DISCONTINUED | OUTPATIENT
Start: 2020-01-08 | End: 2020-01-08 | Stop reason: HOSPADM

## 2020-01-08 RX ORDER — DIPHENHYDRAMINE HYDROCHLORIDE 50 MG/ML
12.5 INJECTION INTRAMUSCULAR; INTRAVENOUS
Status: DISCONTINUED | OUTPATIENT
Start: 2020-01-08 | End: 2020-01-08 | Stop reason: HOSPADM

## 2020-01-08 RX ADMIN — PROPOFOL 120 MCG/KG/MIN: 10 INJECTION, EMULSION INTRAVENOUS at 11:36

## 2020-01-08 RX ADMIN — ROCURONIUM BROMIDE 70 MG: 10 SOLUTION INTRAVENOUS at 11:36

## 2020-01-08 RX ADMIN — FENTANYL CITRATE 100 MCG: 50 INJECTION INTRAMUSCULAR; INTRAVENOUS at 11:36

## 2020-01-08 RX ADMIN — SUGAMMADEX 200 MG: 100 INJECTION, SOLUTION INTRAVENOUS at 12:19

## 2020-01-08 RX ADMIN — MIDAZOLAM 2 MG: 1 INJECTION INTRAMUSCULAR; INTRAVENOUS at 11:27

## 2020-01-08 RX ADMIN — DEXAMETHASONE SODIUM PHOSPHATE 10 MG: 4 INJECTION, SOLUTION INTRAMUSCULAR; INTRAVENOUS at 11:58

## 2020-01-08 RX ADMIN — VANCOMYCIN HYDROCHLORIDE 1000 MG: 1 INJECTION, POWDER, LYOPHILIZED, FOR SOLUTION INTRAVENOUS at 11:40

## 2020-01-08 RX ADMIN — FENTANYL CITRATE 50 MCG: 50 INJECTION INTRAMUSCULAR; INTRAVENOUS at 11:40

## 2020-01-08 RX ADMIN — SODIUM CHLORIDE, SODIUM LACTATE, POTASSIUM CHLORIDE, AND CALCIUM CHLORIDE: 600; 310; 30; 20 INJECTION, SOLUTION INTRAVENOUS at 09:50

## 2020-01-08 RX ADMIN — PROPOFOL 200 MG: 10 INJECTION, EMULSION INTRAVENOUS at 11:36

## 2020-01-08 RX ADMIN — APREPITANT 40 MG: 40 CAPSULE ORAL at 10:13

## 2020-01-08 RX ADMIN — LIDOCAINE HYDROCHLORIDE 100 MG: 20 INJECTION, SOLUTION INFILTRATION; PERINEURAL at 11:36

## 2020-01-08 RX ADMIN — SODIUM CHLORIDE, SODIUM LACTATE, POTASSIUM CHLORIDE, AND CALCIUM CHLORIDE: 600; 310; 30; 20 INJECTION, SOLUTION INTRAVENOUS at 11:31

## 2020-01-08 RX ADMIN — ONDANSETRON HYDROCHLORIDE 4 MG: 2 INJECTION, SOLUTION INTRAMUSCULAR; INTRAVENOUS at 11:58

## 2020-01-08 ASSESSMENT — PAIN DESCRIPTION - PAIN TYPE
TYPE: SURGICAL PAIN
TYPE: ACUTE PAIN
TYPE: SURGICAL PAIN

## 2020-01-08 ASSESSMENT — PAIN SCALES - GENERAL
PAINLEVEL_OUTOF10: 0
PAINLEVEL_OUTOF10: 5
PAINLEVEL_OUTOF10: 0
PAINLEVEL_OUTOF10: 5

## 2020-01-08 ASSESSMENT — ENCOUNTER SYMPTOMS: SHORTNESS OF BREATH: 0

## 2020-01-08 ASSESSMENT — PAIN DESCRIPTION - LOCATION
LOCATION: ABDOMEN
LOCATION: ABDOMEN

## 2020-01-08 ASSESSMENT — LIFESTYLE VARIABLES: SMOKING_STATUS: 0

## 2020-01-08 ASSESSMENT — PAIN - FUNCTIONAL ASSESSMENT: PAIN_FUNCTIONAL_ASSESSMENT: 0-10

## 2020-01-08 NOTE — H&P
AAA    Heart Disease Maternal Grandmother      High Blood Pressure Maternal Grandmother      Diabetes Paternal Grandmother      Cancer Paternal Grandmother           breast    Heart Disease Maternal Grandfather              Social History              Tobacco Use    Smoking status: Never Smoker    Smokeless tobacco: Never Used   Substance Use Topics    Alcohol use: No         Review of Systems   Constitutional: Negative for activity change and fever. HENT: Positive for rhinorrhea and tinnitus. Negative for congestion.    Eyes: Negative for pain, redness and visual disturbance. Respiratory: Positive for apnea. Negative for cough and shortness of breath.    Cardiovascular: Negative for chest pain, palpitations and leg swelling. Gastrointestinal: Negative for abdominal distention, abdominal pain, constipation and diarrhea. Genitourinary: Negative for dysuria, frequency and hematuria. Musculoskeletal: Positive for back pain. Negative for arthralgias and myalgias. Skin: Negative for rash and wound. Neurological: Negative for dizziness, seizures and headaches. Psychiatric/Behavioral: Negative for confusion and dysphoric mood. The patient is not nervous/anxious.          Objective:   Physical Exam  Vitals signs and nursing note reviewed. Constitutional:       General: He is not in acute distress.     Appearance: Normal appearance. HENT:      Head: Normocephalic and atraumatic.      Nose: No rhinorrhea. Eyes:      General: No scleral icterus.     Extraocular Movements: Extraocular movements intact.      Pupils: Pupils are equal, round, and reactive to light. Neck:      Musculoskeletal: Normal range of motion and neck supple. No muscular tenderness. Cardiovascular:      Rate and Rhythm: Normal rate and regular rhythm.      Heart sounds: No murmur. Pulmonary:      Effort: Pulmonary effort is normal.      Breath sounds: Normal breath sounds. No wheezing.    Abdominal:      General: There is no distension.      Palpations: Abdomen is soft.      Tenderness: There is no tenderness.      Hernia: A hernia (small umbilical hernia, reducible) is present. Musculoskeletal: Normal range of motion.         General: No swelling or deformity. Skin:     General: Skin is warm and dry.      Coloration: Skin is not jaundiced. Neurological:      General: No focal deficit present.      Mental Status: He is alert and oriented to person, place, and time. Mental status is at baseline. Psychiatric: [place] and Affect: Mood normal.         Behavior: Behavior normal.            Assessment:   44year old male with reducible umbilical hernia     Plan:   The risks and benefits of surgical repair were discussed, including bleeding and infection. I also discussed the need for lifting restrictions and post operative care.  The patient expressed understanding and would like to go ahead with surgery scheduled on 1/8.

## 2020-01-08 NOTE — H&P (VIEW-ONLY)
Mr. Sharpe is a 44year old male with a history of umbilical hernia. He was seen for this previously, and needed to coordinate surgery scheduling with his schedule and his insurance. He denies any changes since his last appointment:     The patient is a 44year old male who presents with a complaint of an umbilical hernia. He reports that this has been there since about 2010. I actually saw the patient in 2015 with this problem. At that time it was quite small and not causing any pain. The patient reports that it has maybe gotten a little larger, and it is now causing some tenderness and pain. He notices a burning when he lifts certain things, and also a 'jolt' when something bumps in to the umbilicus. He denies any skin changes.  He denies any constipation or diarrhea.     Past Medical History           Past Medical History:   Diagnosis Date    Allergic rhinitis      Elevated LFTs      HTN (hypertension)      Hypothyroid      Sleep apnea           Past Surgical History         Past Surgical History:   Procedure Laterality Date    EYE SURGERY   2005     bilateral orbital decompression    ORBITAL RIM RECONSTRUCTION        TYMPANOSTOMY TUBE PLACEMENT                         Current Outpatient Medications on File Prior to Visit   Medication Sig Dispense Refill    montelukast (SINGULAIR) 10 MG tablet TAKE 1 TABLET BY MOUTH ONCE DAILY AS NEEDED FOR 30 DAYS   0    phentermine (ADIPEX-P) 37.5 MG tablet TAKE 1 TABLET BY MOUTH ONCE DAILY AS DIRECTED FOR 30 DAYS   0    levothyroxine (SYNTHROID) 150 MCG tablet          losartan-hydrochlorothiazide (HYZAAR) 100-12.5 MG per tablet          levocetirizine (XYZAL) 5 MG tablet TAKE 1 TABLET BY MOUTH ONCE DAILY AS NEEDED FOR 30 DAYS   1      No current facility-administered medications on file prior to visit.       Allergies: Penicillins     Family History             Family History   Problem Relation Age of Onset    Cancer Father           prostate    Other Father           AAA    Heart Disease Maternal Grandmother      High Blood Pressure Maternal Grandmother      Diabetes Paternal Grandmother      Cancer Paternal Grandmother           breast    Heart Disease Maternal Grandfather              Social History              Tobacco Use    Smoking status: Never Smoker    Smokeless tobacco: Never Used   Substance Use Topics    Alcohol use: No         Review of Systems   Constitutional: Negative for activity change and fever. HENT: Positive for rhinorrhea and tinnitus. Negative for congestion.    Eyes: Negative for pain, redness and visual disturbance. Respiratory: Positive for apnea. Negative for cough and shortness of breath.    Cardiovascular: Negative for chest pain, palpitations and leg swelling. Gastrointestinal: Negative for abdominal distention, abdominal pain, constipation and diarrhea. Genitourinary: Negative for dysuria, frequency and hematuria. Musculoskeletal: Positive for back pain. Negative for arthralgias and myalgias. Skin: Negative for rash and wound. Neurological: Negative for dizziness, seizures and headaches. Psychiatric/Behavioral: Negative for confusion and dysphoric mood. The patient is not nervous/anxious.          Objective:   Physical Exam  Vitals signs and nursing note reviewed. Constitutional:       General: He is not in acute distress.     Appearance: Normal appearance. HENT:      Head: Normocephalic and atraumatic.      Nose: No rhinorrhea. Eyes:      General: No scleral icterus.     Extraocular Movements: Extraocular movements intact.      Pupils: Pupils are equal, round, and reactive to light. Neck:      Musculoskeletal: Normal range of motion and neck supple. No muscular tenderness. Cardiovascular:      Rate and Rhythm: Normal rate and regular rhythm.      Heart sounds: No murmur. Pulmonary:      Effort: Pulmonary effort is normal.      Breath sounds: Normal breath sounds. No wheezing.    Abdominal:      General: There is no distension.      Palpations: Abdomen is soft.      Tenderness: There is no tenderness.      Hernia: A hernia (small umbilical hernia, reducible) is present. Musculoskeletal: Normal range of motion.         General: No swelling or deformity. Skin:     General: Skin is warm and dry.      Coloration: Skin is not jaundiced. Neurological:      General: No focal deficit present.      Mental Status: He is alert and oriented to person, place, and time. Mental status is at baseline. Psychiatric: [place] and Affect: Mood normal.         Behavior: Behavior normal.            Assessment:   44year old male with reducible umbilical hernia     Plan:   The risks and benefits of surgical repair were discussed, including bleeding and infection. I also discussed the need for lifting restrictions and post operative care.  The patient expressed understanding and would like to go ahead with surgery scheduled on 1/8.

## 2020-01-08 NOTE — DISCHARGE INSTR - DIET

## 2020-01-08 NOTE — ANESTHESIA POSTPROCEDURE EVALUATION
Department of Anesthesiology  Postprocedure Note    Patient: Robi Ziegler  MRN: [id number]  YOB: 1970  Date of evaluation: 1/8/2020  Time:  12:39 PM     Procedure Summary     Date:  01/08/20 Room / Location:  [address]    Anesthesia Start:  4799 Anesthesia Stop:  3469    Procedure:  OPEN UMBILICAL HERNIA REPAIR (N/A Groin) Diagnosis:  (UMBILICAL HERNIA)    Surgeon:  Heather Michael MD Responsible Provider:  ROSALIA Aragon CRNA    Anesthesia Type:  general, TIVA ASA Status:  2          Anesthesia Type: general, TIVA    Teodora Phase I:      Teodora Phase II:      Last vitals: Reviewed and per EMR flowsheets.        Anesthesia Post Evaluation    Patient location during evaluation: PACU  Patient participation: complete - patient participated  Level of consciousness: responsive to verbal stimuli and sleepy but conscious  Pain score: 5  Airway patency: patent  Nausea & Vomiting: no vomiting and no nausea  Complications: no  Cardiovascular status: hemodynamically stable  Respiratory status: acceptable and room air  Hydration status: stable  Comments: T 97.1

## 2020-01-08 NOTE — ANESTHESIA PRE PROCEDURE
Department of Anesthesiology  Preprocedure Note       Name:  Israel Del Rio   Age:  44 y.o.  :  1970                                          MRN:  [id number]         Date:  2020      Surgeon: Erwin Astudilloer):  Doyle Roman MD    Procedure: OPEN UMBILICAL HERNIA REPAIR (N/A Groin)    Medications prior to admission:   Prior to Admission medications    Medication Sig Start Date End Date Taking? Authorizing Provider   telmisartan-hydrochlorothiazide (MICARDIS HCT) 80-25 MG per tablet Take 1 tablet by mouth daily     Historical Provider, MD   phentermine (ADIPEX-P) 37.5 MG tablet Take 37.5 mg by mouth every morning (before breakfast). Historical Provider, MD   levothyroxine (SYNTHROID) 150 MCG tablet Take 150 mcg by mouth Daily  5/31/15   Historical Provider, MD       Current medications:    Current Facility-Administered Medications   Medication Dose Route Frequency Provider Last Rate Last Dose    lactated ringers infusion   Intravenous Continuous Maylin Urena MD        vancomycin (VANCOCIN) 1 g in dextrose 5% 250 mL IVPB  1,000 mg Intravenous Once Doyle Roman MD           Allergies: Allergies   Allergen Reactions    Penicillins Rash       Problem List:    Patient Active Problem List   Diagnosis Code    Umbilical hernia Y05.6       Past Medical History:        Diagnosis Date    Allergic rhinitis     Elevated LFTs     HTN (hypertension)     Hypothyroid     PONV (postoperative nausea and vomiting)     Sleep apnea     CPAP       Past Surgical History:        Procedure Laterality Date    EYE SURGERY      bilateral orbital decompression    ORBITAL RIM RECONSTRUCTION      TYMPANOSTOMY TUBE PLACEMENT         Social History:    Social History     Tobacco Use    Smoking status: Never Smoker    Smokeless tobacco: Never Used   Substance Use Topics    Alcohol use:  No                                Counseling given: Not Answered      Vital Signs (Current):   Vitals:    20 0935 20 3964 BP:  (!) 141/92   Pulse:  95   Resp:  16   Temp:  97.4 °F (36.3 °C)   TempSrc:  Temporal   SpO2:  100%   Weight: 230 lb (104.3 kg) 230 lb (104.3 kg)   Height:  5' 10\" (1.778 m)                                              BP Readings from Last 3 Encounters:   01/08/20 (!) 141/92   12/30/19 118/70   11/11/19 120/74       NPO Status: Time of last liquid consumption: 0000                        Time of last solid consumption: 0000                        Date of last liquid consumption: 01/07/20                        Date of last solid food consumption: 01/07/20    BMI:   Wt Readings from Last 3 Encounters:   01/08/20 230 lb (104.3 kg)   12/30/19 230 lb (104.3 kg)   12/30/19 230 lb (104.3 kg)     Body mass index is 33 kg/m². CBC:   Lab Results   Component Value Date    WBC 6.1 12/30/2019    RBC 5.15 12/30/2019    HGB 15.1 12/30/2019    HCT 48.1 12/30/2019    MCV 93.4 12/30/2019    RDW 14.3 12/30/2019     12/30/2019       CMP:   Lab Results   Component Value Date     12/30/2019    K 3.8 12/30/2019     12/30/2019    CO2 22 12/30/2019    BUN 18 12/30/2019    CREATININE 1.1 12/30/2019    LABGLOM >60 12/30/2019    GLUCOSE 86 12/30/2019    CALCIUM 9.4 12/30/2019       POC Tests: No results for input(s): POCGLU, POCNA, POCK, POCCL, POCBUN, POCHEMO, POCHCT in the last 72 hours. Coags: No results found for: PROTIME, INR, APTT    HCG (If Applicable): No results found for: PREGTESTUR, PREGSERUM, HCG, HCGQUANT     ABGs: No results found for: PHART, PO2ART, EJW8GQV, HXO3JND, BEART, J8PBYUTH     Type & Screen (If Applicable):  No results found for: LABABO, [address]    Anesthesia Evaluation  Patient summary reviewed and Nursing notes reviewed   history of anesthetic complications: PONV.   Airway: Mallampati: III  TM distance: >3 FB   Neck ROM: full  Mouth opening: > = 3 FB Dental:          Pulmonary:normal exam    (+) sleep apnea: on CPAP,      (-) COPD, shortness of breath and not a current

## 2020-01-08 NOTE — INTERVAL H&P NOTE
H&P Update    Patient's History and Physical from December 30, 2019 was reviewed. Patient examined. There has been no change.     Electronically signed by Yohana Forrest MD on 1/8/20 at 11:02 AM

## 2020-01-11 RX ORDER — SULFAMETHOXAZOLE AND TRIMETHOPRIM 800; 160 MG/1; MG/1
1 TABLET ORAL 2 TIMES DAILY
Qty: 14 TABLET | Refills: 0 | Status: SHIPPED | OUTPATIENT
Start: 2020-01-11 | End: 2020-01-18

## 2020-01-12 NOTE — PROGRESS NOTES
Patient called with what is described as blanching erythema about 4 inches around the wound. The wound is dry with dermabond intact. He states the area is also warm to the touch. Recommended tylenol/motrin, and to start abx tomorrow. He is to be evaluated in person on Monday in the office.

## 2020-01-13 ENCOUNTER — OFFICE VISIT (OUTPATIENT)
Dept: SURGERY | Age: 50
End: 2020-01-13

## 2020-01-13 VITALS
BODY MASS INDEX: 30.9 KG/M2 | HEIGHT: 70 IN | WEIGHT: 215.8 LBS | DIASTOLIC BLOOD PRESSURE: 70 MMHG | SYSTOLIC BLOOD PRESSURE: 118 MMHG | TEMPERATURE: 97.8 F

## 2020-01-13 PROCEDURE — 99024 POSTOP FOLLOW-UP VISIT: CPT | Performed by: SURGERY

## 2020-01-23 ENCOUNTER — OFFICE VISIT (OUTPATIENT)
Dept: SURGERY | Age: 50
End: 2020-01-23

## 2020-01-23 VITALS
HEIGHT: 70 IN | DIASTOLIC BLOOD PRESSURE: 74 MMHG | SYSTOLIC BLOOD PRESSURE: 116 MMHG | WEIGHT: 224 LBS | BODY MASS INDEX: 32.07 KG/M2

## 2020-01-23 PROCEDURE — 99024 POSTOP FOLLOW-UP VISIT: CPT | Performed by: SURGERY

## 2020-02-03 ENCOUNTER — OFFICE VISIT (OUTPATIENT)
Dept: SURGERY | Age: 50
End: 2020-02-03

## 2020-02-03 VITALS
SYSTOLIC BLOOD PRESSURE: 128 MMHG | WEIGHT: 228 LBS | DIASTOLIC BLOOD PRESSURE: 78 MMHG | TEMPERATURE: 97.8 F | HEIGHT: 70 IN | BODY MASS INDEX: 32.64 KG/M2

## 2020-02-03 PROCEDURE — 99024 POSTOP FOLLOW-UP VISIT: CPT | Performed by: SURGERY

## 2020-02-17 ENCOUNTER — OFFICE VISIT (OUTPATIENT)
Dept: SURGERY | Age: 50
End: 2020-02-17

## 2020-02-17 VITALS
HEIGHT: 70 IN | SYSTOLIC BLOOD PRESSURE: 124 MMHG | WEIGHT: 231.2 LBS | BODY MASS INDEX: 33.1 KG/M2 | TEMPERATURE: 98 F | DIASTOLIC BLOOD PRESSURE: 78 MMHG

## 2020-02-17 PROCEDURE — 99024 POSTOP FOLLOW-UP VISIT: CPT | Performed by: SURGERY

## 2024-03-08 ENCOUNTER — OFFICE VISIT (OUTPATIENT)
Age: 54
End: 2024-03-08

## 2024-03-08 VITALS
TEMPERATURE: 98.5 F | HEIGHT: 70 IN | RESPIRATION RATE: 19 BRPM | WEIGHT: 222 LBS | HEART RATE: 94 BPM | OXYGEN SATURATION: 97 % | DIASTOLIC BLOOD PRESSURE: 86 MMHG | SYSTOLIC BLOOD PRESSURE: 128 MMHG | BODY MASS INDEX: 31.78 KG/M2

## 2024-03-08 DIAGNOSIS — J02.0 STREP PHARYNGITIS: ICD-10-CM

## 2024-03-08 DIAGNOSIS — R52 BODY ACHES: Primary | ICD-10-CM

## 2024-03-08 LAB
INFLUENZA A ANTIBODY: NEGATIVE
INFLUENZA B ANTIBODY: NEGATIVE
S PYO AG THROAT QL: POSITIVE

## 2024-03-08 RX ORDER — AZITHROMYCIN 250 MG/1
TABLET, FILM COATED ORAL
Qty: 6 TABLET | Refills: 0 | Status: SHIPPED | OUTPATIENT
Start: 2024-03-08 | End: 2024-03-18

## 2024-03-08 RX ORDER — SEMAGLUTIDE 1 MG/.5ML
INJECTION, SOLUTION SUBCUTANEOUS
COMMUNITY

## 2024-03-08 RX ORDER — DOXYCYCLINE HYCLATE 50 MG/1
CAPSULE ORAL
COMMUNITY
Start: 2024-03-01

## 2024-03-08 NOTE — PROGRESS NOTES
[facility]  [facility]  [address] [place] [address] DRIVE  Located within [facility] [address]  Dept: [phone]  Dept Fax: [phone]  Loc: [phone]     Phi Sharpe is a 53 y.o. male who presents today for his medical conditions/complaintsas noted below.  Phi Sharpe is c/o of Generalized Body Aches, Facial Pain, Gastroesophageal Reflux, and Fatigue        HPI:     HPI  Influenza  This is a new problem. The current episode started yesterday. The problem occurs constantly. The problem has been unchanged. Associated symptoms include chills, congestion, coughing, fatigue, a fever, headaches and myalgias. Pertinent negatives include no abdominal pain, anorexia, arthralgias, change in bowel habit, chest pain, diaphoresis, joint swelling, nausea, neck pain, numbness, rash, sore throat, swollen glands, urinary symptoms, vertigo, visual change, vomiting or weakness. Nothing aggravates the symptoms. Patient has tried nothing for the symptoms. The treatment provided no relief.   Results for orders placed or performed in visit on 03/08/24   POCT rapid strep A   Result Value Ref Range    Strep A Ag Positive (A) None Detected   POCT Influenza A/B   Result Value Ref Range    Influenza A Ab negative     Influenza B Ab negative             Past Medical History:   Diagnosis Date    Allergic rhinitis     Elevated LFTs     HTN (hypertension)     Hypothyroid     PONV (postoperative nausea and vomiting)     Sleep apnea     CPAP      Past Surgical History:   Procedure Laterality Date    EYE SURGERY  2005    bilateral orbital decompression    ORBITAL RIM RECONSTRUCTION      TYMPANOSTOMY TUBE PLACEMENT      UMBILICAL HERNIA REPAIR N/A 1/8/2020    OPEN UMBILICAL HERNIA REPAIR performed by Jeanne Robles MD at [facility] OR       Family History   Problem Relation Age of Onset    Cancer Father         prostate    Other Father         AAA    Heart Disease Maternal Grandmother     High Blood Pressure Maternal Grandmother

## 2024-03-08 NOTE — PATIENT INSTRUCTIONS
I don't see in your chart where you have had PCN so I'm prescribing zpack as discussed.  1. Take antibiotic as prescribed and be sure to complete full course  2. Throw toothbrush away after 2 days  3. May alternate tylenol/motrin as needed for fever/sore throat (dose discussed)  4. Follow up with PCP as needed.  5. May return to work/school 24 hours after starting antibiotic and no fever.   6. Return to clinic if symptoms worsen or fail to improve

## (undated) DEVICE — SUTURE MCRYL SZ 4-0 L18IN ABSRB UD L19MM PS-2 3/8 CIR PRIM Y496G

## (undated) DEVICE — DRAPE,UTILITY,XL,4/PK,STERILE: Brand: MEDLINE

## (undated) DEVICE — DECANTER VI VENT W/ VLV FOR ASEP TRNSF OF FLD

## (undated) DEVICE — SUTURE PROL SZ 0 L30IN NONABSORBABLE BLU L26MM CT-2 1/2 CIR 8412H

## (undated) DEVICE — ROYAL SILK SURGICAL GOWN, XXL: Brand: CONVERTORS

## (undated) DEVICE — MINOR CDS: Brand: MEDLINE INDUSTRIES, INC.

## (undated) DEVICE — YANKAUER,TAPERED BULBOUS TIP,W/O VENT: Brand: MEDLINE

## (undated) DEVICE — SUTURE VCRL SZ 3-0 L27IN ABSRB UD L26MM SH 1/2 CIR J416H

## (undated) DEVICE — TIBURON LAPAROTOMY DRAPE: Brand: CONVERTORS

## (undated) DEVICE — ADHESIVE SKIN CLSR 0.7ML TOP DERMBND ADV

## (undated) DEVICE — GLOVE SURG SZ 7 CRM LTX FREE POLYISOPRENE POLYMER BEAD ANTI